# Patient Record
Sex: MALE | Race: WHITE | NOT HISPANIC OR LATINO | URBAN - METROPOLITAN AREA
[De-identification: names, ages, dates, MRNs, and addresses within clinical notes are randomized per-mention and may not be internally consistent; named-entity substitution may affect disease eponyms.]

---

## 2022-01-06 ENCOUNTER — EMERGENCY (EMERGENCY)
Facility: HOSPITAL | Age: 52
LOS: 0 days | Discharge: HOME | End: 2022-01-06
Attending: EMERGENCY MEDICINE | Admitting: EMERGENCY MEDICINE
Payer: MEDICARE

## 2022-01-06 VITALS
HEIGHT: 69 IN | RESPIRATION RATE: 18 BRPM | OXYGEN SATURATION: 95 % | SYSTOLIC BLOOD PRESSURE: 159 MMHG | HEART RATE: 106 BPM | DIASTOLIC BLOOD PRESSURE: 74 MMHG | WEIGHT: 279.99 LBS | TEMPERATURE: 100 F

## 2022-01-06 DIAGNOSIS — I10 ESSENTIAL (PRIMARY) HYPERTENSION: ICD-10-CM

## 2022-01-06 DIAGNOSIS — E11.9 TYPE 2 DIABETES MELLITUS WITHOUT COMPLICATIONS: ICD-10-CM

## 2022-01-06 DIAGNOSIS — R06.02 SHORTNESS OF BREATH: ICD-10-CM

## 2022-01-06 DIAGNOSIS — U07.1 COVID-19: ICD-10-CM

## 2022-01-06 DIAGNOSIS — J45.909 UNSPECIFIED ASTHMA, UNCOMPLICATED: ICD-10-CM

## 2022-01-06 PROCEDURE — 71045 X-RAY EXAM CHEST 1 VIEW: CPT | Mod: 26

## 2022-01-06 PROCEDURE — 99284 EMERGENCY DEPT VISIT MOD MDM: CPT

## 2022-01-06 RX ORDER — ACETAMINOPHEN 500 MG
650 TABLET ORAL ONCE
Refills: 0 | Status: COMPLETED | OUTPATIENT
Start: 2022-01-06 | End: 2022-01-06

## 2022-01-06 RX ORDER — IPRATROPIUM/ALBUTEROL SULFATE 18-103MCG
3 AEROSOL WITH ADAPTER (GRAM) INHALATION ONCE
Refills: 0 | Status: COMPLETED | OUTPATIENT
Start: 2022-01-06 | End: 2022-01-06

## 2022-01-06 RX ADMIN — Medication 650 MILLIGRAM(S): at 18:43

## 2022-01-06 RX ADMIN — Medication 3 MILLILITER(S): at 20:14

## 2022-01-06 NOTE — ED PROVIDER NOTE - CARE PROVIDER_API CALL
Shayy Ruiz ()  Internal Medicine  [address] [place] [address]  Phone: [phone]  Fax: [phone]  Established Patient  Follow Up Time: 1-3 Days

## 2022-01-06 NOTE — ED PROVIDER NOTE - NS ED ROS FT
CONSTITUTIONAL:  see HPI  SKIN:  no skin rash  EYES:  no visual changes  ENMT: no neck pain or stiffness  CARD:  no chest pain  RESP:  + SOB and cough; no respiratory distress  ABD:  no abdominal pain, nausea, vomiting, or diarrhea  :  no dysuria, frequency or burning  MSK:  no back pain  NEURO:  no headache   Except as documented in the HPI,  all other systems are negative

## 2022-01-06 NOTE — ED ADULT TRIAGE NOTE - CHIEF COMPLAINT QUOTE
Pt presents to ED c/o increasing SOB, fever, sore throat and weakness. Pt reports + covid swab last Friday.

## 2022-01-06 NOTE — ED PROVIDER NOTE - ATTENDING CONTRIBUTION TO CARE
50 yo m with pmh of dm, htn, ?asthma, tested positive for covid 1 week ago.  pt says had fever sore throat and cough which had improved, but recently have been feeling tightness when breathing.  pt says sometimes feels like he is wheezing.  no n/v/d, no abd pain, no cp.  exam: nad, ncat, perrl, eomi, mmm, rrr, ctab, abd soft, nt, nd aox3, no calf tenderness, imp: pt with ?asthma, here with covid, appears nontoxic, will try neb and steroids for symptom relief.  pt refused mdi for home, bu will take steroids.

## 2022-01-06 NOTE — ED PROVIDER NOTE - PATIENT PORTAL LINK FT
You can access the FollowMyHealth Patient Portal offered by [facility] by registering at the following website: [email or web address]. By joining [facility] FollowMyHealth portal, you will also be able to view your health information using other applications (apps) compatible with our system.

## 2022-01-06 NOTE — ED PROVIDER NOTE - PROGRESS NOTE DETAILS
TA: 51 year old male with a history of HTN, DM, Covid + 6 days presenting for shortness of breath. States he initially have fever, sore throat, cough which improved but now still having dyspnea on exertion so came to get evaluation.   CONSTITUTIONAL: Well-developed; well-nourished; in no acute distress.   SKIN: warm, dry  HEAD: Normocephalic; atraumatic.  EYES: no conjunctival injection. PERRLA. EOMI.   ENT: No nasal discharge; airway clear.  NECK: Supple; non tender.  CARD: S1, S2 normal; no murmurs, gallops, or rubs.    RESP: No wheezes, rales or rhonchi.  ABD: soft ntnd.   EXT: Normal ROM.  No clubbing, cyanosis or edema.   LYMPH: No acute cervical adenopathy.  NEURO: Alert, oriented, grossly unremarkable.  Plan: CXR, EKG, tylenol  PSYCH: Cooperative, appropriate. TD: Pt improved after duonebs, breathing more comfortably, RR nl, no increased WOB, lungs clear, speaking in full sentences. Discussed rx for prednisone, f/u and return precautions, pt indicates understanding and agreement with plan, ready for discharge.

## 2022-01-06 NOTE — ED PROVIDER NOTE - IV ALTEPLASE INCLUSION HIDDEN
Cardiac Electrophysiology  Progress Note    Chief Complaint:   Chief Complaint   Patient presents with   • Office Visit   • Follow-up      History of Present Illness:   Landry Norman is a 88 year old male who presents for follow up evaluation of: atrial fibrillation. Per my previous note, e has a h/o HTN, HLD and PAF in setting of acute pancreatitis 7/2013. He continues to be free of afib since that isolated incident.       Since last visit, he has felt well with no issues.  He denies chest pain, SOB, n/v, diaphoresis, palpitations or syncope.   He has some bruising from baby aspirin, but he will continue this.  He stays very active (and even smokes occasional cigar).      DEPRESSION ASSESSMENT/PLAN:  Depression screening is negative no further plan needed.    Review of Systems:  (For the following ROS, pertinent positives are in bold; pertinent negatives are in italics.)  CONSTITUTIONAL: fevers, chills, sweats, weight loss, weight gain, fatigue  EYES: vision changes, double vision, blurring  ENMT: hearing loss, tinnitus, epistaxis, sores, voice changes, sore throat, sinus congestion, sinus pain, rhinorrhea   CV: chest pain, dyspnea, palpitations, orthopnea, PND, LE swelling  RESP: cough, sputum, dyspnea, hemoptysis, pleuritic pain  GI: abdominal pain, bloating, nausea, vomiting, hematemesis, diarrhea, constipation, BRBPR, melena, GERD/heartburn  : hematuria, dysuria, frequency, urgency, nocturia  MUSCULOSKELETAL: pain, muscle weakness, joint pain, joint swelling, decreased ROM, back pain  SKIN: rash, pain, pruritis, lesions, lumps, skin breakdown  NEURO: headache, dizziness, LOC, weakness, paresthesias, gait problems, seizures, diplopia, numbness, memory loss   PSYCHIATRIC: depressed mood, diminished interest/pleasure, difficulty sleeping, decreased concentration, anxiety   HEMATOLOGIC: anemia, easy bruising, persistent bleeding, DVT/Clots  All the above systems were reviewed.     Past Medical  History:  Past Medical History:   Diagnosis Date   • Benign essential HTN    • Dyslipidemia    • PAF (paroxysmal atrial fibrillation) (CMS/HCC)     in setting of acute pancreatitis   • Pancreatitis    • Prostate cancer (CMS/HCC)     s/p prostatectomy       Past Surgical History:  Past Surgical History:   Procedure Laterality Date   • Laminectomy     • Prostatectomy         Allergies:  ALLERGIES: no known allergies.     Medications:  Current Outpatient Medications   Medication Sig Dispense Refill   • clotrimazole-betamethasone (LOTRISONE) 1-0.05 % cream Apply to affected area bid x4 weeks 45 g 0   • lisinopril (ZESTRIL) 20 MG tablet Take 1 tablet by mouth daily. 90 tablet 1   • cyclobenzaprine (FLEXERIL) 10 MG tablet Take 1 Tab by mouth 3 (three) times daily as needed for Muscle spasms. - Oral     • gemfibrozil (LOPID) 600 MG tablet Take 1 tablet by mouth 2 times daily (before meals).     • aspirin 81 MG tablet Take 1 tablet by mouth daily.     • rivaroxaban (XARELTO) 10 MG Tab Take 10 mg by mouth.       No current facility-administered medications for this visit.        Physical Exam:  Visit Vitals  /78 (BP Location: RUE - Right upper extremity, Patient Position: Sitting, Cuff Size: Regular)   Pulse 76   Resp 20   Ht 5' 10\" (1.778 m)   Wt 93 kg (205 lb)   SpO2 96%   BMI 29.41 kg/m²     General:  Alert and oriented x3, No apparent distress  HEENT: anicteric, moist mucous membranes, EOMI  Neck: Supple, no elevated JVD, no thyromegaly  CVS: RRR, S1 S2, no m/r/g  Pulmonary: Lungs CTA b/l  GI: Abdomen soft, NT, ND, +BS  Extremities: No edema, +2 pulses  Musculoskeletal: +5/5 strength in all extremities  Neuro: No focal deficits or asymmetry.  CNs grossly intact.  Skin: No obvious skin lesions or rashes  Psychiatric: Normal affect.     Reviewed Data:    Labs:   Lab Services on 07/01/2020   Component Date Value Ref Range Status   • PSA, TOTAL (SCREENING) 07/01/2020 0.72  0.00 - 7.00 ng/mL Final   • CHOLESTEROL, TOTAL  07/01/2020 167  140 - 200 mg/dL Final   • HDL CHOLESTEROL 07/01/2020 36* >40 mg/dL Final   • LDL CHOL, CALCULATED 07/01/2020 101* 30 - 100 mg/dL Final   • TRIGLYCERIDES 07/01/2020 152  0 - 200 mg/dL Final   • CALCIUM, SERUM 07/01/2020 9.7  8.6 - 10.6 mg/dL Final   • ALBUMIN, SERUM 07/01/2020 4.3  3.6 - 5.1 g/dL Final   • ALKALINE PHOSPHATASE([id number]) 07/01/2020 72  45 - 115 U/L Final   • ALANINE AMINOTRANSFERASE(SGPT) 07/01/2020 18  5 - 49 U/L Final   • ASPARTATE AMINOTRNSFRASE(SGOT) 07/01/2020 27  14 - 43 U/L Final   • BILIRUBIN, TOTAL 07/01/2020 0.5  0.0 - 1.3 mg/dL Final   • BLOOD UREA NITROGEN 07/01/2020 21  6 - 27 mg/dL Final   • CHLORIDE, SERUM 07/01/2020 104  96 - 107 mmol/L Final   • CO2 VENOUS 07/01/2020 29  22 - 32 mmol/L Final   • CREATININE, SERUM 07/01/2020 1.1  0.6 - 1.6 mg/dL Final   • K (POTASSIUM, SERUM) 07/01/2020 5.2  3.5 - 5.3 mmol/L Final   • NA (SODIUM, SERUM) 07/01/2020 140  136 - 146 mmol/L Final   • GLUCOSE, FASTING 07/01/2020 118* 60 - 100 mg/dL Final    Comment: ADA Recommendations:       *Fasting Glucose  < 100 mg/dL . . . . . . . Normal fasting glucose       *Fasting Glucose  100-125 mg/dL . . . . . Impaired fasting glucose       *Fasting Glucose  >= 126 mg/dL . . . . . . Provisional diagnosis of   diabetes                                (confirm with 2 hr Post Prandial)     • PROTEIN, TOTAL SERUM 07/01/2020 7.0  6.4 - 8.5 g/dL Final   • EGFR*  07/01/2020 >60  >60 mL/min/[1.73m2] Final   • EGFR* NON- 07/01/2020 >60  >60 mL/min/[1.73m2] Final   • WHITE BLOOD CELL COUNT 07/01/2020 6.3  4.0 - 10.0 10*3/uL Final   • RED BLOOD CELL COUNT 07/01/2020 5.11  3.90 - 5.70 10*6/uL Final   • HEMOGLOBIN 07/01/2020 15.5  13.7 - 17.5 g/dL Final   • HEMATOCRIT 07/01/2020 45.1  40.0 - 51.0 % Final   • MEAN CORPUSCULAR VOLUME 07/01/2020 88.3  79.0 - 95.0 fL Final   • MEAN CORPUSCULAR HGB 07/01/2020 30.3  27.0 - 34.0 pg Final   • MEAN CORPUSCULAR HGB CONCENTRATION 07/01/2020  34.4  32.0 - 36.0 % Final   • PLATELET COUNT 07/01/2020 245  150 - 400 10*3/uL Final   • MEAN PLATELET VOLUME 07/01/2020 10.0  8.6 - 12.4 fL Final   • RED CELL DISTRIBUTION WIDTH 07/01/2020 14.9* 11.3 - 14.8 % Final   • NEUTROPHIL PERCENT 07/01/2020 41.5  34.0 - 73.5 % Final   • NEUTROPHIL ABSOLUTE # 07/01/2020 2.6  1.4 - 6.5 10*3/uL Final   • LYMPH PERCENT 07/01/2020 40.9  20.5 - 51.1 % Final   • LYMPHOCYTE ABSOLUTE # 07/01/2020 2.6  1.2 - 3.4 10*3/uL Final   • MONOCYTE PERCENT 07/01/2020 11.1  4.3 - 12.9 % Final   • MONOCYTE ABSOLUTE # 07/01/2020 0.7  0.2 - 0.9 10*3/uL Final   • EOSINOPHIL % 07/01/2020 6.0  0.0 - 10.0 % Final   • EOSINOPHIL ABSOLUTE # 07/01/2020 0.4  0.0 - 0.5 10*3/uL Final   • BASOPHIL % 07/01/2020 0.5  0.0 - 1.2 % Final   • BASOPHIL ABSOLUTE # 07/01/2020 0.0  0.0 - 0.1 10*3/uL Final   • DIFFERENTIAL TYPE 07/01/2020 AUTO DIFF   Final       ECG:  Date: 7/2/2020  I have reviewed the ECG with the following interpretation:  NSR 76 bpm, LVH, LAD    ECG:  Date: 12/3/18  I have reviewed the ECG with the following interpretation:  NSR 73 bpm, PAC, LAFB     ECG:  Date: 10/12/17  I have reviewed the ECG with the following interpretation:  Sinus bradycardia 57 bpm, 1st degree AV block, LAFB, T wave inversions unchanged from previous ECG     ECG: Date: 10/6/16  I have reviewed the ECG with the following interpretation:  NSR 70 bpm, LAFB, T wave inversions unchanged from previous ECG      ECG:  Date: 10/8/15  I have reviewed the ECG with the following interpretation:  NSR 70 bpm, LAFB, lateral T wave inversions, PAC      Echocardiogram: Date: 7/5/13    I have reviewed the echo with the following summary:    EF 55% with LVH and mild diastolic dysfunction. LA mildly dilated.       Nuclear Stress Test: Date: 7/18/13    I have reviewed the results with following summary:    1. Normal myocardial perfusion examination.   2. The overall quality of the study is good.  3. Normal perfusion imaging without evidence of  inducible  ischemia or infarct.  4. Normal left ventricular volume with normal systolic thickening  and function and an ejection fraction of 80%.  5. No previous study was available for comparison.      Holter Monitor: Date: 7/15/13:    I have reviewed the results with the following summary:    No evidence of cryptogenic atrial fibrillation. PAT x few beats. Otherwise, NSR.      Problem List:  Patient Active Problem List   Diagnosis   • PAF (paroxysmal atrial fibrillation) (CMS/HCC)   • Pancreatitis   • Benign essential HTN   • Dyslipidemia   • Prostate cancer (CMS/HCC)       Assessment/Plan:  1. Paroxysmal atrial fibrillation:  - Only at time of acute pancreatitis    - CHADS-VASC = 3 (age > 75, HTN).  - Only on aspirin 81 mg due to skin bruising from full dose aspirin    - No Toprol XL due to cost    - No evidence of cryptogenic afib per 48 hour Holter    - Normal nuclear stress test       2. Pancreatitis:    - Symptoms resolved       3. Hyperlipidemia:    - LDL well controlled.    - Cont gemfibrozil 600 mg bid       4. Hypertension:    - BP well controlled  - Cont Lisinopril 20 mg daily    - Toprol stopped due to cost      Michael Taylor M.D.  Cardiac Electrophysiology  [facility]    Electronically signed by: Michael Taylor MD       show

## 2022-01-06 NOTE — ED PROVIDER NOTE - OBJECTIVE STATEMENT
51 year old male with a history of HTN, DM, Covid + 6 days presenting for shortness of breath. States he initially have fever, sore throat, cough which improved but now still having dyspnea on exertion so came to get evaluation.

## 2022-01-06 NOTE — ED PROVIDER NOTE - PHYSICAL EXAMINATION
CONSTITUTIONAL: Well-developed; well-nourished; in no acute distress.   SKIN: warm, dry  HEAD: Normocephalic; atraumatic.  EYES: no conjunctival injection. PERRLA. EOMI.   ENT: No nasal discharge; airway clear.  NECK: Supple; non tender.  CARD: S1, S2 normal; no murmurs, gallops, or rubs.    RESP: No wheezes, rales or rhonchi.  ABD: soft ntnd.   EXT: Normal ROM.  No clubbing, cyanosis or edema.   LYMPH: No acute cervical adenopathy.  NEURO: Alert, oriented, grossly unremarkable.  Plan: CXR, EKG, tylenol  PSYCH: Cooperative, appropriate

## 2022-01-06 NOTE — ED PROVIDER NOTE - CLINICAL SUMMARY MEDICAL DECISION MAKING FREE TEXT BOX
pt with ?asthma, here with covid, appears nontoxic, will try neb and steroids for symptom relief.  pt refused mdi for home, bu will take steroids.
